# Patient Record
Sex: MALE | Race: WHITE | NOT HISPANIC OR LATINO | Employment: FULL TIME | URBAN - METROPOLITAN AREA
[De-identification: names, ages, dates, MRNs, and addresses within clinical notes are randomized per-mention and may not be internally consistent; named-entity substitution may affect disease eponyms.]

---

## 2018-11-07 ENCOUNTER — HOSPITAL ENCOUNTER (EMERGENCY)
Facility: MEDICAL CENTER | Age: 43
End: 2018-11-07
Attending: EMERGENCY MEDICINE
Payer: COMMERCIAL

## 2018-11-07 VITALS
OXYGEN SATURATION: 97 % | DIASTOLIC BLOOD PRESSURE: 97 MMHG | TEMPERATURE: 98 F | SYSTOLIC BLOOD PRESSURE: 137 MMHG | HEART RATE: 66 BPM | BODY MASS INDEX: 29.17 KG/M2 | RESPIRATION RATE: 18 BRPM | WEIGHT: 227.29 LBS | HEIGHT: 74 IN

## 2018-11-07 DIAGNOSIS — D69.6 THROMBOCYTOPENIA (HCC): ICD-10-CM

## 2018-11-07 DIAGNOSIS — K40.90 RIGHT INGUINAL HERNIA: ICD-10-CM

## 2018-11-07 LAB
ALBUMIN SERPL BCP-MCNC: 4.3 G/DL (ref 3.2–4.9)
ALBUMIN/GLOB SERPL: 1.7 G/DL
ALP SERPL-CCNC: 41 U/L (ref 30–99)
ALT SERPL-CCNC: 40 U/L (ref 2–50)
ANION GAP SERPL CALC-SCNC: 10 MMOL/L (ref 0–11.9)
APPEARANCE UR: CLEAR
AST SERPL-CCNC: 37 U/L (ref 12–45)
BASOPHILS # BLD AUTO: 0.9 % (ref 0–1.8)
BASOPHILS # BLD: 0.07 K/UL (ref 0–0.12)
BILIRUB SERPL-MCNC: 1.7 MG/DL (ref 0.1–1.5)
BILIRUB UR QL STRIP.AUTO: NEGATIVE
BUN SERPL-MCNC: 18 MG/DL (ref 8–22)
CALCIUM SERPL-MCNC: 10.4 MG/DL (ref 8.5–10.5)
CHLORIDE SERPL-SCNC: 104 MMOL/L (ref 96–112)
CO2 SERPL-SCNC: 23 MMOL/L (ref 20–33)
COLOR UR: YELLOW
CREAT SERPL-MCNC: 0.94 MG/DL (ref 0.5–1.4)
EOSINOPHIL # BLD AUTO: 0.17 K/UL (ref 0–0.51)
EOSINOPHIL NFR BLD: 2.3 % (ref 0–6.9)
ERYTHROCYTE [DISTWIDTH] IN BLOOD BY AUTOMATED COUNT: 38.7 FL (ref 35.9–50)
GLOBULIN SER CALC-MCNC: 2.6 G/DL (ref 1.9–3.5)
GLUCOSE SERPL-MCNC: 99 MG/DL (ref 65–99)
GLUCOSE UR STRIP.AUTO-MCNC: NEGATIVE MG/DL
HCT VFR BLD AUTO: 49.8 % (ref 42–52)
HGB BLD-MCNC: 17.6 G/DL (ref 14–18)
IMM GRANULOCYTES # BLD AUTO: 0.04 K/UL (ref 0–0.11)
IMM GRANULOCYTES NFR BLD AUTO: 0.5 % (ref 0–0.9)
KETONES UR STRIP.AUTO-MCNC: NEGATIVE MG/DL
LEUKOCYTE ESTERASE UR QL STRIP.AUTO: NEGATIVE
LIPASE SERPL-CCNC: 57 U/L (ref 11–82)
LYMPHOCYTES # BLD AUTO: 1.75 K/UL (ref 1–4.8)
LYMPHOCYTES NFR BLD: 23.6 % (ref 22–41)
MCH RBC QN AUTO: 32.1 PG (ref 27–33)
MCHC RBC AUTO-ENTMCNC: 35.3 G/DL (ref 33.7–35.3)
MCV RBC AUTO: 90.9 FL (ref 81.4–97.8)
MICRO URNS: NORMAL
MONOCYTES # BLD AUTO: 0.82 K/UL (ref 0–0.85)
MONOCYTES NFR BLD AUTO: 11 % (ref 0–13.4)
NEUTROPHILS # BLD AUTO: 4.58 K/UL (ref 1.82–7.42)
NEUTROPHILS NFR BLD: 61.7 % (ref 44–72)
NITRITE UR QL STRIP.AUTO: NEGATIVE
NRBC # BLD AUTO: 0 K/UL
NRBC BLD-RTO: 0 /100 WBC
PH UR STRIP.AUTO: 6 [PH]
PLATELET # BLD AUTO: 92 K/UL (ref 164–446)
PMV BLD AUTO: 8.9 FL (ref 9–12.9)
POTASSIUM SERPL-SCNC: 4.1 MMOL/L (ref 3.6–5.5)
PROT SERPL-MCNC: 6.9 G/DL (ref 6–8.2)
PROT UR QL STRIP: NEGATIVE MG/DL
RBC # BLD AUTO: 5.48 M/UL (ref 4.7–6.1)
RBC UR QL AUTO: NEGATIVE
SODIUM SERPL-SCNC: 137 MMOL/L (ref 135–145)
SP GR UR STRIP.AUTO: 1.01
UROBILINOGEN UR STRIP.AUTO-MCNC: 1 MG/DL
WBC # BLD AUTO: 7.4 K/UL (ref 4.8–10.8)

## 2018-11-07 PROCEDURE — 99284 EMERGENCY DEPT VISIT MOD MDM: CPT

## 2018-11-07 PROCEDURE — 83690 ASSAY OF LIPASE: CPT

## 2018-11-07 PROCEDURE — 85025 COMPLETE CBC W/AUTO DIFF WBC: CPT

## 2018-11-07 PROCEDURE — 80053 COMPREHEN METABOLIC PANEL: CPT

## 2018-11-07 PROCEDURE — 81003 URINALYSIS AUTO W/O SCOPE: CPT

## 2018-11-07 PROCEDURE — 36415 COLL VENOUS BLD VENIPUNCTURE: CPT

## 2018-11-07 RX ORDER — LACTULOSE 20 G/30ML
30 SOLUTION ORAL 3 TIMES DAILY
Qty: 6 EACH | Refills: 0 | Status: SHIPPED | OUTPATIENT
Start: 2018-11-07 | End: 2018-11-09

## 2018-11-07 ASSESSMENT — ENCOUNTER SYMPTOMS
CONSTIPATION: 1
VOMITING: 0
NAUSEA: 0
ABDOMINAL PAIN: 1
FEVER: 0

## 2018-11-07 NOTE — LETTER
Emergency Services     November 7, 2018    Patient: Charlie Patino II   YOB: 1975   Date of Visit: 11/7/2018       To Whom It May Concern:    Charlie Patino was seen and treated in our emergency department on 11/7/2018.   He may return to work on 11/9/2018.    Sincerely,     ANA FOSTER II, M.D.  [facility], EMERGENCY DEPT  Dept: [phone]

## 2018-11-08 ENCOUNTER — PATIENT OUTREACH (OUTPATIENT)
Dept: HEALTH INFORMATION MANAGEMENT | Facility: OTHER | Age: 43
End: 2018-11-08

## 2018-11-08 ASSESSMENT — ENCOUNTER SYMPTOMS
SHORTNESS OF BREATH: 0
BRUISES/BLEEDS EASILY: 0
COUGH: 0
NECK PAIN: 0
HEADACHES: 0

## 2018-11-08 NOTE — ED NOTES
Pt reports RLQ pain that radiates to R groin x 1 year. Pt states pain has been worse that past few days and is worse upon standing. Pt denies n/v/d or urinary symptoms. Reports constipation x several days, but has not attempted any otc laxatives. Pt in nad.

## 2018-11-08 NOTE — ED NOTES
"Pt ambulates to triage with c/c abd pain - \"I think I have a hernia.\"  Pt reports worsening abd pain today after twisting.  Pt also c/c constipation since Sunday.  A&ox4.  Pt to lobby & advised to inform RN of any changes.   "

## 2018-11-08 NOTE — ED NOTES
D/c instructions and rx reviewed with pt. Pt verbalized understanding. Pt in nad at time of d/c. Pt ambulatory out of department with steady gait.

## 2018-11-08 NOTE — ED NOTES
Pt reassessed. VSS. Apologized to pt for wait. Pt states understanding. Instructed to alert tech or RN of any changes.

## 2018-11-08 NOTE — ED PROVIDER NOTES
"ED Provider Note    Scribed for Damaso ANA Stanford II, M* by Hay Hennessy. 11/7/2018  9:07 PM    Means of Arrival: Walk in  History obtained by: Patient  Limitations: None    CHIEF COMPLAINT  Chief Complaint   Patient presents with   • Abdominal Pain     \"I think I have a hernia\"   • Constipation     since Sunday       HPI  Charlie Patino II is a 43 y.o. male who presents to the Emergency Department for evaluation of for evaluation of intermittent dull lower abdominal pain onset 2 weeks ago. Mr. Patino states that when he stands up, he feels a bulge at his right groin. Also present when bearing down. Bulge reduces spontaneously when laying flat. Pain not present when sitting at rest.  Mr. Patino has not had a bowel movement since 4 days ago which is not his baseline. He has tried drinking energy drinks, water, and fiber bars to have a bowel movement without relief. He has never had any issues with constipation in the past. The patient denies any nausea, emesis, epistaxis, and fevers. No alleviating factors noted.    REVIEW OF SYSTEMS  Review of Systems   Constitutional: Negative for fever.   HENT: Negative for nosebleeds.    Respiratory: Negative for cough and shortness of breath.    Cardiovascular: Negative for chest pain.   Gastrointestinal: Positive for abdominal pain and constipation. Negative for nausea and vomiting.   Genitourinary: Negative for hematuria.   Musculoskeletal: Negative for neck pain.   Neurological: Negative for headaches.   Endo/Heme/Allergies: Does not bruise/bleed easily.   All other systems reviewed and are negative.    See HPI for further details.    PAST MEDICAL HISTORY  None noted.    SOCIAL HISTORY  Social History     Social History Main Topics   • Smoking status: Never Smoker   • Smokeless tobacco: Never Used   • Alcohol use Yes      Comment: 10 beers / day    • Drug use: No       SURGICAL HISTORY  patient denies any surgical history    CURRENT MEDICATIONS  Home Medications     " "Reviewed by Joanna Demarco R.N. (Registered Nurse) on 11/07/18 at 1812  Med List Status: Complete   Medication Last Dose Status        Patient Cooper Taking any Medications                       ALLERGIES  No Known Allergies    PHYSICAL EXAM  VITAL SIGNS: /97   Pulse 66   Temp 36.7 °C (98 °F)   Resp 18   Ht 1.88 m (6' 2\")   Wt 103.1 kg (227 lb 4.7 oz)   SpO2 97%   BMI 29.18 kg/m²     Pulse ox interpretation: I interpret this pulse ox as normal.  Constitutional: Well appearing middle aged man  HENT: No signs of trauma, Bilateral external ears normal, Nose normal.   Lymphatic: No inguinal lymphadenopathy noted.   Cardiovascular: Regular rate and rhythm, no murmurs. Symmetric distal pulses. No cyanosis of extremities. No peripheral edema of extremities.  Thorax & Lungs: Normal breath sounds, No respiratory distress, No wheezing, No chest tenderness.   Abdomen: Bowel sounds normal, Soft, No tenderness, No masses, No pulsatile masses. No peritoneal signs. No obvious hernias  : When standing up, there is palpable hernia defect in the right inguinal canal, no signs of incarcerated hernia. Uncircumcised penis, normal scrotum, non tender testicle, no skin changes  Skin: Warm, Dry, No erythema, No rash. No petechia or purpura  Back: No midline bony tenderness, No CVA tenderness.   Musculoskeletal: Good range of motion in all major joints. No tenderness to palpation or major deformities noted.   Neurologic: Alert , Normal motor function, Normal sensory function, No focal deficits noted.   Psychiatric: Affect normal, Judgment normal, Mood normal.     DIAGNOSTIC STUDIES / PROCEDURES    LABS  Pertinent Labs & Imaging studies reviewed. (See chart for details)    COURSE & MEDICAL DECISION MAKING  Pertinent Labs & Imaging studies reviewed. (See chart for details)    9:07 PM This is a 43 y.o. male who presents with lower abdominal pain and the differential diagnosis includes but is not limited to Inguinal hernia, no " signs of strangulation or incarceration. History consistent with constipation. Ordered for CBC, CMP, lipase, and UA to evaluate. These were completed at triage. Significant finding of thrombocytopenia. I informed the patient that he should to take a stool softener and laxative for treatment of constipatoin. I believe since his vital signs are normal and his abdominal exam is benign, he does not need a CT scan and will be able to be discharged home, but he will need to follow up with a general surgeon for right inguinal hernia. He made need imaging outpatient. We talked about this and I did offer to order imaging here if he had additional concerns or worries. He was satisfied with plan for trial treatment of constipation and surgery follow up.  He does not have a primary care provider at this time.  Because of his low platelet count I have left a message with our schedulers to schedule him a primary care appointment.    The patient will return for worsening symptoms and is stable at the time of discharge. The patient verbalizes understanding and will comply.    The patient is referred to a primary physician for blood pressure management, diabetic screening, and for all other preventative health concerns.    DISPOSITION:  Patient will be discharged home in stable condition.    FOLLOW UP:  Rochelle Nunes M.D.  [address] [facility] [facility] [id number]  [address]  [place] [id number] [phone]    Go to   for follow up appointment and reevaluation    You'll be called by our schedulers within 2 days to set up a primary care appointment for low platelet counts            OUTPATIENT MEDICATIONS:  Discharge Medication List as of 11/7/2018  9:36 PM      START taking these medications    Details   lactulose 20 GM/30ML Solution Take 30 mL by mouth 3 times a day for 2 days., Disp-6 Each, R-0, Normal               FINAL IMPRESSION  1. Right inguinal hernia    2. Thrombocytopenia (HCC)          IHay (Scribe), am scribing for, and in the  presence of, ANGELITA Covington II.    Electronically signed by: Hay Hennessy (Scribe), 11/7/2018    I, ANGELITA Covington II personally performed the services described in this documentation, as scribed by Hay Hennessy in my presence, and it is both accurate and complete. C    The note accurately reflects work and decisions made by me.  Damaso Stanford II  11/8/2018  1:18 AM

## 2018-11-08 NOTE — DISCHARGE INSTRUCTIONS
Procedure Component Value Ref Range Date/Time   URINALYSIS,CULTURE IF INDICATED [[id number]] Collected: 11/07/18 2112   Lab Status: In process Specimen: Urine Updated: 11/07/18 2132   ESTIMATED GFR [[id number]] Collected: 11/07/18 1831   Lab Status: Final result Updated: 11/07/18 1907    GFR If African American >60 >60 mL/min/1.73 m 2     GFR If Non African American >60 >60 mL/min/1.73 m 2    COMP METABOLIC PANEL [[id number]] (Abnormal) Collected: 11/07/18 1831   Lab Status: Final result Specimen: Blood Updated: 11/07/18 1906    Sodium 137 135 - 145 mmol/L     Potassium 4.1 3.6 - 5.5 mmol/L     Chloride 104 96 - 112 mmol/L     Co2 23 20 - 33 mmol/L     Anion Gap 10.0 0.0 - 11.9     Glucose 99 65 - 99 mg/dL     Bun 18 8 - 22 mg/dL     Creatinine 0.94 0.50 - 1.40 mg/dL     Calcium 10.4 8.5 - 10.5 mg/dL     AST(SGOT) 37 12 - 45 U/L     ALT(SGPT) 40 2 - 50 U/L     Alkaline Phosphatase 41 30 - 99 U/L     Total Bilirubin 1.7 (H) 0.1 - 1.5 mg/dL     Albumin 4.3 3.2 - 4.9 g/dL     Total Protein 6.9 6.0 - 8.2 g/dL     Globulin 2.6 1.9 - 3.5 g/dL     A-G Ratio 1.7 g/dL    LIPASE [[id number]] Collected: 11/07/18 1831   Lab Status: Final result Specimen: Blood Updated: 11/07/18 1906    Lipase 57 11 - 82 U/L    CBC WITH DIFFERENTIAL [[id number]] (Abnormal) Collected: 11/07/18 1831   Lab Status: Final result Specimen: Blood Updated: 11/07/18 1845    WBC 7.4 4.8 - 10.8 K/uL     RBC 5.48 4.70 - 6.10 M/uL     Hemoglobin 17.6 14.0 - 18.0 g/dL     Hematocrit 49.8 42.0 - 52.0 %     MCV 90.9 81.4 - 97.8 fL     MCH 32.1 27.0 - 33.0 pg     MCHC 35.3 33.7 - 35.3 g/dL     RDW 38.7 35.9 - 50.0 fL     Platelet Count 92 (L) 164 - 446 K/uL     MPV 8.9 (L) 9.0 - 12.9 fL     Neutrophils-Polys 61.70 44.00 - 72.00 %     Lymphocytes 23.60 22.00 - 41.00 %     Monocytes 11.00 0.00 - 13.40 %     Eosinophils 2.30 0.00 - 6.90 %     Basophils 0.90 0.00 - 1.80 %     Immature Granulocytes 0.50 0.00 - 0.90 %     Nucleated RBC 0.00 /100 WBC     Neutrophils  (Absolute) 4.58 1.82 - 7.42 K/uL     Comment: Includes immature neutrophils, if present.       Lymphs (Absolute) 1.75 1.00 - 4.80 K/uL     Monos (Absolute) 0.82 0.00 - 0.85 K/uL     Eos (Absolute) 0.17 0.00 - 0.51 K/uL     Baso (Absolute) 0.07 0.00 - 0.12 K/uL     Immature Granulocytes (abs) 0.04 0.00 - 0.11 K/uL     NRBC (Absolute) 0.00 K/uL

## 2018-11-29 DIAGNOSIS — Z01.812 PRE-OPERATIVE LABORATORY EXAMINATION: ICD-10-CM

## 2018-11-29 LAB
ALBUMIN SERPL BCP-MCNC: 4.1 G/DL (ref 3.2–4.9)
ALBUMIN/GLOB SERPL: 1.5 G/DL
ALP SERPL-CCNC: 38 U/L (ref 30–99)
ALT SERPL-CCNC: 39 U/L (ref 2–50)
ANION GAP SERPL CALC-SCNC: 8 MMOL/L (ref 0–11.9)
AST SERPL-CCNC: 30 U/L (ref 12–45)
BASOPHILS # BLD AUTO: 0.9 % (ref 0–1.8)
BASOPHILS # BLD: 0.06 K/UL (ref 0–0.12)
BILIRUB SERPL-MCNC: 1.3 MG/DL (ref 0.1–1.5)
BUN SERPL-MCNC: 12 MG/DL (ref 8–22)
CALCIUM SERPL-MCNC: 9.8 MG/DL (ref 8.5–10.5)
CHLORIDE SERPL-SCNC: 101 MMOL/L (ref 96–112)
CO2 SERPL-SCNC: 29 MMOL/L (ref 20–33)
CREAT SERPL-MCNC: 0.78 MG/DL (ref 0.5–1.4)
EOSINOPHIL # BLD AUTO: 0.09 K/UL (ref 0–0.51)
EOSINOPHIL NFR BLD: 1.4 % (ref 0–6.9)
ERYTHROCYTE [DISTWIDTH] IN BLOOD BY AUTOMATED COUNT: 37.3 FL (ref 35.9–50)
GLOBULIN SER CALC-MCNC: 2.8 G/DL (ref 1.9–3.5)
GLUCOSE SERPL-MCNC: 116 MG/DL (ref 65–99)
HCT VFR BLD AUTO: 49.5 % (ref 42–52)
HGB BLD-MCNC: 18.1 G/DL (ref 14–18)
IMM GRANULOCYTES # BLD AUTO: 0.06 K/UL (ref 0–0.11)
IMM GRANULOCYTES NFR BLD AUTO: 0.9 % (ref 0–0.9)
INR PPP: 1.06 (ref 0.87–1.13)
LYMPHOCYTES # BLD AUTO: 1.38 K/UL (ref 1–4.8)
LYMPHOCYTES NFR BLD: 21.3 % (ref 22–41)
MCH RBC QN AUTO: 33 PG (ref 27–33)
MCHC RBC AUTO-ENTMCNC: 36.6 G/DL (ref 33.7–35.3)
MCV RBC AUTO: 90.2 FL (ref 81.4–97.8)
MONOCYTES # BLD AUTO: 0.71 K/UL (ref 0–0.85)
MONOCYTES NFR BLD AUTO: 11 % (ref 0–13.4)
NEUTROPHILS # BLD AUTO: 4.18 K/UL (ref 1.82–7.42)
NEUTROPHILS NFR BLD: 64.5 % (ref 44–72)
NRBC # BLD AUTO: 0 K/UL
NRBC BLD-RTO: 0 /100 WBC
PLATELET # BLD AUTO: 99 K/UL (ref 164–446)
PMV BLD AUTO: 9.3 FL (ref 9–12.9)
POTASSIUM SERPL-SCNC: 4.1 MMOL/L (ref 3.6–5.5)
PROT SERPL-MCNC: 6.9 G/DL (ref 6–8.2)
PROTHROMBIN TIME: 13.9 SEC (ref 12–14.6)
RBC # BLD AUTO: 5.49 M/UL (ref 4.7–6.1)
SODIUM SERPL-SCNC: 138 MMOL/L (ref 135–145)
WBC # BLD AUTO: 6.5 K/UL (ref 4.8–10.8)

## 2018-11-29 PROCEDURE — 85610 PROTHROMBIN TIME: CPT

## 2018-11-29 PROCEDURE — 80053 COMPREHEN METABOLIC PANEL: CPT

## 2018-11-29 PROCEDURE — 36415 COLL VENOUS BLD VENIPUNCTURE: CPT

## 2018-11-29 PROCEDURE — 85025 COMPLETE CBC W/AUTO DIFF WBC: CPT

## 2018-11-29 RX ORDER — OMEGA-3 FATTY ACIDS/FISH OIL 300-1000MG
600 CAPSULE ORAL PRN
COMMUNITY

## 2018-12-04 ENCOUNTER — HOSPITAL ENCOUNTER (OUTPATIENT)
Facility: MEDICAL CENTER | Age: 43
End: 2018-12-04
Attending: SURGERY | Admitting: SURGERY
Payer: COMMERCIAL

## 2018-12-04 VITALS
OXYGEN SATURATION: 96 % | RESPIRATION RATE: 16 BRPM | WEIGHT: 229.28 LBS | HEART RATE: 69 BPM | BODY MASS INDEX: 29.43 KG/M2 | DIASTOLIC BLOOD PRESSURE: 106 MMHG | HEIGHT: 74 IN | TEMPERATURE: 98.5 F | SYSTOLIC BLOOD PRESSURE: 149 MMHG

## 2018-12-04 DIAGNOSIS — K40.90 RIGHT INGUINAL HERNIA: ICD-10-CM

## 2018-12-04 PROCEDURE — 501838 HCHG SUTURE GENERAL: Performed by: SURGERY

## 2018-12-04 PROCEDURE — 160036 HCHG PACU - EA ADDL 30 MINS PHASE I: Performed by: SURGERY

## 2018-12-04 PROCEDURE — 160035 HCHG PACU - 1ST 60 MINS PHASE I: Performed by: SURGERY

## 2018-12-04 PROCEDURE — 160048 HCHG OR STATISTICAL LEVEL 1-5: Performed by: SURGERY

## 2018-12-04 PROCEDURE — C1781 MESH (IMPLANTABLE): HCPCS | Performed by: SURGERY

## 2018-12-04 PROCEDURE — 500380 HCHG DRAIN, PENROSE 1/4X12: Performed by: SURGERY

## 2018-12-04 PROCEDURE — 160028 HCHG SURGERY MINUTES - 1ST 30 MINS LEVEL 3: Performed by: SURGERY

## 2018-12-04 PROCEDURE — 160009 HCHG ANES TIME/MIN: Performed by: SURGERY

## 2018-12-04 PROCEDURE — A6402 STERILE GAUZE <= 16 SQ IN: HCPCS | Performed by: SURGERY

## 2018-12-04 PROCEDURE — 700101 HCHG RX REV CODE 250

## 2018-12-04 PROCEDURE — 160046 HCHG PACU - 1ST 60 MINS PHASE II: Performed by: SURGERY

## 2018-12-04 PROCEDURE — 160039 HCHG SURGERY MINUTES - EA ADDL 1 MIN LEVEL 3: Performed by: SURGERY

## 2018-12-04 PROCEDURE — 160025 RECOVERY II MINUTES (STATS): Performed by: SURGERY

## 2018-12-04 PROCEDURE — 700111 HCHG RX REV CODE 636 W/ 250 OVERRIDE (IP)

## 2018-12-04 PROCEDURE — 160002 HCHG RECOVERY MINUTES (STAT): Performed by: SURGERY

## 2018-12-04 DEVICE — MESH FLAT SHEET 3 X 6 - (3EA/CA): Type: IMPLANTABLE DEVICE | Site: GROIN | Status: FUNCTIONAL

## 2018-12-04 RX ORDER — SODIUM CHLORIDE, SODIUM LACTATE, POTASSIUM CHLORIDE, CALCIUM CHLORIDE 600; 310; 30; 20 MG/100ML; MG/100ML; MG/100ML; MG/100ML
INJECTION, SOLUTION INTRAVENOUS CONTINUOUS
Status: DISCONTINUED | OUTPATIENT
Start: 2018-12-04 | End: 2018-12-04 | Stop reason: HOSPADM

## 2018-12-04 RX ORDER — OXYCODONE HYDROCHLORIDE 5 MG/1
10 TABLET ORAL
Status: DISCONTINUED | OUTPATIENT
Start: 2018-12-04 | End: 2018-12-04 | Stop reason: HOSPADM

## 2018-12-04 RX ORDER — HYDROMORPHONE HYDROCHLORIDE 1 MG/ML
0.4 INJECTION, SOLUTION INTRAMUSCULAR; INTRAVENOUS; SUBCUTANEOUS
Status: DISCONTINUED | OUTPATIENT
Start: 2018-12-04 | End: 2018-12-04 | Stop reason: HOSPADM

## 2018-12-04 RX ORDER — MEPERIDINE HYDROCHLORIDE 25 MG/ML
12.5 INJECTION INTRAMUSCULAR; INTRAVENOUS; SUBCUTANEOUS
Status: DISCONTINUED | OUTPATIENT
Start: 2018-12-04 | End: 2018-12-04 | Stop reason: HOSPADM

## 2018-12-04 RX ORDER — HYDROMORPHONE HYDROCHLORIDE 1 MG/ML
0.2 INJECTION, SOLUTION INTRAMUSCULAR; INTRAVENOUS; SUBCUTANEOUS
Status: DISCONTINUED | OUTPATIENT
Start: 2018-12-04 | End: 2018-12-04 | Stop reason: HOSPADM

## 2018-12-04 RX ORDER — HYDROCODONE BITARTRATE AND ACETAMINOPHEN 5; 325 MG/1; MG/1
1-2 TABLET ORAL EVERY 4 HOURS PRN
Qty: 30 TAB | Refills: 0 | Status: SHIPPED | OUTPATIENT
Start: 2018-12-04 | End: 2018-12-08

## 2018-12-04 RX ORDER — OXYCODONE HYDROCHLORIDE 5 MG/1
5 TABLET ORAL
Status: DISCONTINUED | OUTPATIENT
Start: 2018-12-04 | End: 2018-12-04 | Stop reason: HOSPADM

## 2018-12-04 RX ORDER — ONDANSETRON 2 MG/ML
4 INJECTION INTRAMUSCULAR; INTRAVENOUS
Status: DISCONTINUED | OUTPATIENT
Start: 2018-12-04 | End: 2018-12-04 | Stop reason: HOSPADM

## 2018-12-04 RX ORDER — SODIUM CHLORIDE, SODIUM LACTATE, POTASSIUM CHLORIDE, CALCIUM CHLORIDE 600; 310; 30; 20 MG/100ML; MG/100ML; MG/100ML; MG/100ML
INJECTION, SOLUTION INTRAVENOUS ONCE
Status: COMPLETED | OUTPATIENT
Start: 2018-12-04 | End: 2018-12-04

## 2018-12-04 RX ORDER — OXYCODONE HCL 5 MG/5 ML
10 SOLUTION, ORAL ORAL
Status: DISCONTINUED | OUTPATIENT
Start: 2018-12-04 | End: 2018-12-04 | Stop reason: HOSPADM

## 2018-12-04 RX ORDER — HYDROMORPHONE HYDROCHLORIDE 1 MG/ML
0.1 INJECTION, SOLUTION INTRAMUSCULAR; INTRAVENOUS; SUBCUTANEOUS
Status: DISCONTINUED | OUTPATIENT
Start: 2018-12-04 | End: 2018-12-04 | Stop reason: HOSPADM

## 2018-12-04 RX ORDER — BUPIVACAINE HYDROCHLORIDE AND EPINEPHRINE 5; 5 MG/ML; UG/ML
INJECTION, SOLUTION EPIDURAL; INTRACAUDAL; PERINEURAL
Status: DISCONTINUED | OUTPATIENT
Start: 2018-12-04 | End: 2018-12-04 | Stop reason: HOSPADM

## 2018-12-04 RX ORDER — HALOPERIDOL 5 MG/ML
1 INJECTION INTRAMUSCULAR
Status: DISCONTINUED | OUTPATIENT
Start: 2018-12-04 | End: 2018-12-04 | Stop reason: HOSPADM

## 2018-12-04 RX ORDER — OXYCODONE HCL 5 MG/5 ML
5 SOLUTION, ORAL ORAL
Status: DISCONTINUED | OUTPATIENT
Start: 2018-12-04 | End: 2018-12-04 | Stop reason: HOSPADM

## 2018-12-04 RX ADMIN — SODIUM CHLORIDE, SODIUM LACTATE, POTASSIUM CHLORIDE, CALCIUM CHLORIDE: 600; 310; 30; 20 INJECTION, SOLUTION INTRAVENOUS at 12:00

## 2018-12-04 ASSESSMENT — PAIN SCALES - GENERAL
PAINLEVEL_OUTOF10: 0
PAINLEVEL_OUTOF10: 2
PAINLEVEL_OUTOF10: 0

## 2018-12-04 NOTE — OR NURSING
1525 REPORT FROM ALLIE FORD. PATIENT RESTING QUIETLY.  DRESSING DRY AND IN TACT.  ICE PACK IN PLACE.      1555 MORE AWAKE.  GIVEN SPRITE.  WIFE BROUGHT TO BEDSIDE.  1615 WIFE TO PHARMACY.    1628  NO CHANGE IN DRESSING.  PATIENT UP AND DRESSED.  AMBULATES TO BATHROOM.  VOID WITHOUT PROBLEM.  IN RECLINER WITHOUT COMPLAINT.  1645  DISCHARGE INSTRUCTIONS TO PATIENT AND WIFE.  WAITING FOR RX.  1715 WIFE BACK FROM PHARMACY.  PATIENT FEELS READY FOR DISCHARGE.  ALL QUESTIONS ANSWERED.

## 2018-12-04 NOTE — OR NURSING
1514 Pt received to PACU with report from .  Respirations even and unlabored, LMA in place.LMA removed 1524 Report given to LOGAN Kim and care transferred.

## 2018-12-05 NOTE — DISCHARGE INSTRUCTIONS
ACTIVITY: Rest and take it easy for the first 24 hours.  A responsible adult is recommended to remain with you during that time.  It is normal to feel sleepy.  We encourage you to not do anything that requires balance, judgment or coordination.    MILD FLU-LIKE SYMPTOMS ARE NORMAL. YOU MAY EXPERIENCE GENERALIZED MUSCLE ACHES, THROAT IRRITATION, HEADACHE AND/OR SOME NAUSEA.    FOR 24 HOURS DO NOT:  Drive, operate machinery or run household appliances.  Drink beer or alcoholic beverages.   Make important decisions or sign legal documents.    SPECIAL INSTRUCTIONS: **AVOID ANY HEAVY LIFTING GREATER THAN 15 POUNDS FOR FOUR WEEKS*    DIET: To avoid nausea, slowly advance diet as tolerated, avoiding spicy or greasy foods for the first day.  Add more substantial food to your diet according to your physician's instructions.  Babies can be fed formula or breast milk as soon as they are hungry.  INCREASE FLUIDS AND FIBER TO AVOID CONSTIPATION.    SURGICAL DRESSING/BATHING: *OK TO SHOWER ON THURSDAY.  REMOVE PLASTIC AND GAUZE IN THREE DAYS.   LEAVE UNDERLYING STERISTRIPS ON UNTIL THEY FALL OFF.**    FOLLOW-UP APPOINTMENT:  A follow-up appointment should be arranged with your doctor; call to schedule.    You should CALL YOUR PHYSICIAN if you develop:  Fever greater than 101 degrees F.  Pain not relieved by medication, or persistent nausea or vomiting.  Excessive bleeding (blood soaking through dressing) or unexpected drainage from the wound.  Extreme redness or swelling around the incision site, drainage of pus or foul smelling drainage.  Inability to urinate or empty your bladder within 8 hours.  Problems with breathing or chest pain.    You should call 911 if you develop problems with breathing or chest pain.  If you are unable to contact your doctor or surgical center, you should go to the nearest emergency room or urgent care center.    Physician's telephone #: **[phone]*    If any questions arise, call your doctor.  If  your doctor is not available, please feel free to call the Surgical Center at [phone].  The Center is open Monday through Friday from 7AM to 7PM.  You can also call the HEALTH HOTLINE open 24 hours/day, 7 days/week and speak to a nurse at [phone], or toll free at [phone].    A registered nurse may call you a few days after your surgery to see how you are doing after your procedure.    MEDICATIONS: Resume taking daily medication.  Take prescribed pain medication with food.  If no medication is prescribed, you may take non-aspirin pain medication if needed.  PAIN MEDICATION CAN BE VERY CONSTIPATING.  Take a stool softener or laxative such as senokot, pericolace, or milk of magnesia if needed.    Prescription given for *NORCO**.  Last pain medication given at *____________**.    If your physician has prescribed pain medication that includes Acetaminophen (Tylenol), do not take additional Acetaminophen (Tylenol) while taking the prescribed medication.    Depression / Suicide Risk    As you are discharged from this [facility] facility, it is important to learn how to keep safe from harming yourself.    Recognize the warning signs:  · Abrupt changes in personality, positive or negative- including increase in energy   · Giving away possessions  · Change in eating patterns- significant weight changes-  positive or negative  · Change in sleeping patterns- unable to sleep or sleeping all the time   · Unwillingness or inability to communicate  · Depression  · Unusual sadness, discouragement and loneliness  · Talk of wanting to die  · Neglect of personal appearance   · Rebelliousness- reckless behavior  · Withdrawal from people/activities they love  · Confusion- inability to concentrate     If you or a loved one observes any of these behaviors or has concerns about self-harm, here's what you can do:  · Talk about it- your feelings and reasons for harming yourself  · Remove any means that you might use to hurt  yourself (examples: pills, rope, extension cords, firearm)  · Get professional help from the community (Mental Health, Substance Abuse, psychological counseling)  · Do not be alone:Call your Safe Contact- someone whom you trust who will be there for you.  · Call your local CRISIS HOTLINE 094-2136 or 430-557-9917  · Call your local Children's Mobile Crisis Response Team [place] (632) 311-6028 or www.Cartavi  · Call the toll free National Suicide Prevention Hotlines   · National Suicide Prevention Lifeline 811-421-IVES (5279)  · National Hope Line Network 800-SUICIDE (092-1420)

## 2018-12-05 NOTE — OP REPORT
DATE OF SERVICE:  12/04/2018    SURGEON:  Epi Mi MD    ASSISTANT:  YOSVANY Reddy    TYPE OF ANESTHETIC:  General anesthesia using a laryngeal mask airway plus   local.    ANESTHESIOLOGIST:  Edenilson Agee MD    PREOPERATIVE DIAGNOSIS:  Large right inguinal hernia.    POSTOPERATIVE DIAGNOSIS:  Large right inguinal hernia.    PROCEDURE:  Repair of right inguinal hernia using polypropylene mesh.    FINDINGS:  The patient is a 43-year-old male who presents with a symptomatic   right groin protrusion, this has been present for at least the last month.  He   has had pain in this area for over 2 years.  After evaluation, he elected to   proceed with repair of this hernia.  At the time of surgery, he was found to   have a large indirect inguinal hernia with some chronicity noted to the sac as   well as a moderate size direct inguinal hernia.  Repair was performed using   polypropylene mesh.  There were no apparent complications.    FINDINGS AND PROCEDURE:  The patient was brought to the operating room and   placed on table in supine position.  General anesthesia was provided by the   anesthesiologist, Dr. Agee.  The right groin area was then shaved, prepped   and draped in sterile fashion.  A 0.5% Marcaine with epinephrine was used for   local anesthesia and a total of 30 mL was utilized.  A time-out was called.    The correct patient, correct diagnosis, correct surgery, and correct location   and surgery were discussed and agreed upon, he did receive preoperative IV   antibiotics.  An incision was made directly over the right inguinal canal with   #15 blade through the skin and subcutaneous tissue.  All bleeding was   controlled with electrocautery.  Patient had rather generous subcutaneous   tissue and this was dissected down through Jamal's fascia until the external   oblique aponeurosis identified.  This structure was then opened along the   direction of fibers through the external ring.   Patient was noted to have a   somewhat small ilioinguinal nerve, which was ligated as high as possible, as   low as possible in the operative field in order to prevent chronic pain.  This   had been densely adherent to what appears to be an indirect inguinal hernia   sac.  The hernia sac as well as the cord structures were elevated off the   floor of the inguinal canal with a Penrose drain.  Patient had a lot of   scarring and dense adherent tissue in the direct space all the way down to the   symphysis pubis.  Careful dissection was made in order to elevate the cord   structures off of the floor.  Patient was noted to have a broad direct   inguinal hernia, but the main hernia was an indirect coming through the   internal ring.  This was a chronically scarred, thick sac extending almost the   entire length of the inguinal canal.  This was carefully dissected away from   the vas deferens and testicular vessels and traced back to its origin of the   internal ring.  It was opened and found to contain no bowel contents.  It was   twisted and underwent high ligation with a 3-0 Vicryl suture.  The distal   aspect of the sac was transected and discarded.  The proximal aspect retracted   back into the peritoneal space.  The patient was noted to have a large   internal ring where the hernia had occurred.  A 3x5 inch piece of   polypropylene mesh was then cut to fit the confines of the inguinal space.    The lateral edge of the mesh was then sutured to the shelving portion of the   inguinal ligament with a running 0 Prolene suture beginning at the pubic   tubercle ending about 1 cm above the internal ring.  Likewise, the medial edge   of the mesh was sutured to the internal oblique fascia with a running 0   Prolene suture beginning at the pubic tubercle ending about 1 cm above the   internal ring.  A slit was then made in the upper portion of the mesh which   created 2 tails in the mesh.  The medial tail was then crossed  behind the   spermatic cord, sutured to the lateral tail and to the shelving portion of the   inguinal ligament with 2-0 Prolene sutures.  This made a new internal ring.    The excess portions of the mesh were trimmed and discarded and the edges were   tucked underneath the external oblique aponeurosis.  The Penrose drain was   removed.  Additional 0.5% Marcaine was used for local anesthesia.  The   external oblique was then closed using running 3-0 Vicryl suture loosely   reapproximating the external ring below Jamal's fascia and the dermis was   closed using running 3-0 Vicryl suture.  The skin was closed using running 4-0   Monocryl suture in subcuticular fashion.  Steri-Strips and sterile dressing   were applied.  The patient did tolerate procedure well without complications.    Final sponge and needle count were correct.       ____________________________________     MD CURTIS JAMISON / OMER    DD:  12/04/2018 17:09:52  DT:  12/04/2018 20:11:57    D#:  [id number]  Job#:  [id number]

## 2020-08-02 ENCOUNTER — OFFICE VISIT (OUTPATIENT)
Dept: URGENT CARE | Facility: PHYSICIAN GROUP | Age: 45
End: 2020-08-02
Payer: COMMERCIAL

## 2020-08-02 ENCOUNTER — HOSPITAL ENCOUNTER (OUTPATIENT)
Dept: RADIOLOGY | Facility: MEDICAL CENTER | Age: 45
End: 2020-08-02
Attending: PHYSICIAN ASSISTANT
Payer: COMMERCIAL

## 2020-08-02 VITALS
OXYGEN SATURATION: 95 % | HEART RATE: 72 BPM | WEIGHT: 244 LBS | RESPIRATION RATE: 16 BRPM | DIASTOLIC BLOOD PRESSURE: 92 MMHG | SYSTOLIC BLOOD PRESSURE: 152 MMHG | HEIGHT: 74 IN | BODY MASS INDEX: 31.32 KG/M2 | TEMPERATURE: 98.4 F

## 2020-08-02 DIAGNOSIS — S80.01XA CONTUSION OF RIGHT KNEE AND LOWER LEG, INITIAL ENCOUNTER: ICD-10-CM

## 2020-08-02 DIAGNOSIS — S86.912A KNEE STRAIN, LEFT, INITIAL ENCOUNTER: ICD-10-CM

## 2020-08-02 DIAGNOSIS — M79.671 RIGHT FOOT PAIN: ICD-10-CM

## 2020-08-02 DIAGNOSIS — S80.11XA CONTUSION OF RIGHT KNEE AND LOWER LEG, INITIAL ENCOUNTER: ICD-10-CM

## 2020-08-02 PROCEDURE — 73590 X-RAY EXAM OF LOWER LEG: CPT | Mod: RT

## 2020-08-02 PROCEDURE — 99203 OFFICE O/P NEW LOW 30 MIN: CPT | Performed by: PHYSICIAN ASSISTANT

## 2020-08-02 PROCEDURE — 73630 X-RAY EXAM OF FOOT: CPT | Mod: RT

## 2020-08-02 ASSESSMENT — FIBROSIS 4 INDEX: FIB4 SCORE: 2.09

## 2020-08-02 ASSESSMENT — ENCOUNTER SYMPTOMS
NUMBNESS: 0
BOWEL INCONTINENCE: 0
TINGLING: 0
HEADACHES: 0
ABDOMINAL PAIN: 0
LOSS OF CONSCIOUSNESS: 0

## 2020-08-02 NOTE — PROGRESS NOTES
Subjective:   Charlie Patino II is a 45 y.o. male who presents today with   Chief Complaint   Patient presents with   • Fall     R leg injury/ L knee [id number]        Fall   The accident occurred 5 to 7 days ago. The fall occurred while walking. He fell from a height of 6 to 10 ft. There was no blood loss. The point of impact was the left knee and right knee. The pain is present in the left knee and right lower leg. The pain is mild. The symptoms are aggravated by ambulation and standing. Pertinent negatives include no abdominal pain, bowel incontinence, headaches, hematuria, loss of consciousness, numbness or tingling. He has tried nothing for the symptoms. The treatment provided no relief.     Patient states he was hiking last week and slid down approximately 8 to 10 feet on a vic.  He denies any head injury or any loss of consciousness.  He states that his left knee was significantly affected and now when he bears weight on it he notices mild deformity to the lateral portion.  He states he has had ACL and meniscus repair to the area in 2001.  Patient also notes significant pain and swelling in his right lower leg and right foot. Patient asking for MRI of left knee given history.   PMH:  has a past medical history of Pain (11/29/2018), Pneumonia (2003), and Snoring.  MEDS:   Current Outpatient Medications:   •  Ibuprofen (ADVIL) 200 MG Cap, Take 600 mg by mouth as needed., Disp: , Rfl:   ALLERGIES: No Known Allergies  SURGHX:   Past Surgical History:   Procedure Laterality Date   • INGUINAL HERNIA REPAIR Right 12/4/2018    Procedure: INGUINAL HERNIA REPAIR;  Surgeon: Epi Mi M.D.;  Location: SURGERY SAME DAY [facility];  Service: General   • MENISCUS REPAIR Left 2001    and ACL, tendon repair     SOCHX:  reports that he has never smoked. He has never used smokeless tobacco. He reports current alcohol use. He reports that he does not use drugs.  FH: Reviewed with patient, not pertinent to this  "visit.       Review of Systems   Gastrointestinal: Negative for abdominal pain and bowel incontinence.   Genitourinary: Negative for hematuria.   Musculoskeletal:        Right foot/leg pain, Left knee pain   Neurological: Negative for tingling, loss of consciousness, numbness and headaches.   All other systems reviewed and are negative.       Objective:   /92   Pulse 72   Temp 36.9 °C (98.4 °F) (Temporal)   Resp 16   Ht 1.88 m (6' 2\")   Wt 110.7 kg (244 lb)   SpO2 95%   BMI 31.33 kg/m²   Physical Exam  Vitals signs and nursing note reviewed.   Constitutional:       General: He is not in acute distress.     Appearance: Normal appearance. He is well-developed. He is not ill-appearing or toxic-appearing.   HENT:      Head: Normocephalic and atraumatic.      Right Ear: Hearing normal.      Left Ear: Hearing normal.   Eyes:      Pupils: Pupils are equal, round, and reactive to light.   Cardiovascular:      Rate and Rhythm: Normal rate and regular rhythm.      Heart sounds: Normal heart sounds.   Pulmonary:      Effort: Pulmonary effort is normal.   Musculoskeletal:        Legs:       Comments: Negative María's sign to right leg. No TTP or swelling, erythema to calf. TTP and ecchymosis to the anterior portion of right lower leg and into the right ankle especially the medial malleolus aspect and over the fourth and fifth metatarsal. NVI distally. Left knee shows small deformity with weightbearing to the lateral portion. No gross laxity with valgus/varus, negative anterior drawer test.  No tenderness to palpation and full range of motion with flexion and extension of the right knee.  Slightly limited range of motion with the left knee secondary to swelling.  Small bruise to the left lower back but no tenderness along the spinous process midline or along any of the ribs.   Skin:     General: Skin is warm and dry.             Comments: Abrasions to anterior right lower leg.   Neurological:      Mental Status: He " is alert.      Coordination: Coordination normal.   Psychiatric:         Mood and Affect: Mood normal.       DX FOOT  FINDINGS:  There is no evidence of fracture or dislocation. Alignment is maintained. Accessory ossicles are seen adjacent to the cuboid.        IMPRESSION:     No evidence of acute fracture or dislocation.    DX LOWER LEG  FINDINGS:  There is no evidence of fracture or dislocation. Alignment is maintained. There is soft tissue swelling in the lower leg.        IMPRESSION:     No evidence of acute fracture or dislocation.     Soft tissue swelling in the lower leg.    Assessment/Plan:   Assessment    1. Contusion of right knee and lower leg, initial encounter  - DX-TIBIA AND FIBULA RIGHT; Future  - REFERRAL TO SPORTS MEDICINE    2. Right foot pain  - DX-FOOT-COMPLETE 3+ RIGHT; Future  - REFERRAL TO SPORTS MEDICINE    3. Knee strain, left, initial encounter  - REFERRAL TO SPORTS MEDICINE  - MR-KNEE-W/O LEFT; Future  MRI of the left knee will be ordered given history of repair to that knee.  RICE TREATMENT FOR EXTREMITY INJURIES:  R-rest the extremity as much as possible while pain and swelling persist  I-ice the extremity 15 minutes every 2 hours for the first 24 hours, then 4-5 times daily   C-compress the extremity either with splint or ace wrap as directed  E-elevate the extremity to help with swelling  Follow up with Sports Medicine.   Encouraged close monitoring for any changes in swelling or pain to follow up sooner. No suspicion of DVT today but did discuss potential signs to watch for.   Differential diagnosis, natural history, supportive care, and indications for immediate follow-up discussed.   Patient given instructions and understanding of medications and treatment.    If not improving in 3-5 days, F/U with PCP or return to  if symptoms worsen.    Patient agreeable to plan.      Please note that this dictation was created using voice recognition software. I have made every reasonable  attempt to correct obvious errors, but I expect that there are errors of grammar and possibly content that I did not discover before finalizing the note.    Lefty Chowdary PA-C

## 2020-08-03 ENCOUNTER — TELEPHONE (OUTPATIENT)
Dept: URGENT CARE | Facility: PHYSICIAN GROUP | Age: 45
End: 2020-08-03

## 2020-08-03 ENCOUNTER — TELEPHONE (OUTPATIENT)
Dept: URGENT CARE | Facility: CLINIC | Age: 45
End: 2020-08-03

## 2020-08-03 ENCOUNTER — HOSPITAL ENCOUNTER (OUTPATIENT)
Dept: RADIOLOGY | Facility: MEDICAL CENTER | Age: 45
End: 2020-08-03
Attending: PHYSICIAN ASSISTANT
Payer: COMMERCIAL

## 2020-08-03 DIAGNOSIS — S86.912A KNEE STRAIN, LEFT, INITIAL ENCOUNTER: ICD-10-CM

## 2020-08-03 DIAGNOSIS — S83.242A TEAR OF MEDIAL MENISCUS OF LEFT KNEE, CURRENT, UNSPECIFIED TEAR TYPE, INITIAL ENCOUNTER: ICD-10-CM

## 2020-08-03 PROCEDURE — 73721 MRI JNT OF LWR EXTRE W/O DYE: CPT | Mod: LT

## 2020-08-03 NOTE — TELEPHONE ENCOUNTER
Attempted to call. Left VM. Placed ortho surg today. Encouraged patient to call with any further questions.

## (undated) DEVICE — SUTURE 3-0 VICRYL PLUS SH - 8X 18 INCH (12/BX)

## (undated) DEVICE — CATHETER IV 20 GA X 1-1/4 ---SURG.& SDS ONLY--- (50EA/BX)

## (undated) DEVICE — ELECTRODE 850 FOAM ADHESIVE - HYDROGEL RADIOTRNSPRNT (50/PK)

## (undated) DEVICE — TUBE CONNECTING SUCTION - CLEAR PLASTIC STERILE 72 IN (50EA/CA)

## (undated) DEVICE — GLOVE BIOGEL INDICATOR SZ 7.5 SURGICAL PF LTX - (50PR/BX 4BX/CA)

## (undated) DEVICE — CANISTER SUCTION 3000ML MECHANICAL FILTER AUTO SHUTOFF MEDI-VAC NONSTERILE LF DISP  (40EA/CA)

## (undated) DEVICE — ELECTRODE DUAL RETURN W/ CORD - (50/PK)

## (undated) DEVICE — BLADE SURGICAL #15 - (50/BX 3BX/CA)

## (undated) DEVICE — SUTURE 3-0 VICRYL PLUS SH - 27 INCH (36/BX)

## (undated) DEVICE — DRAPE IOBAN II INCISE 23X17 - (10EA/BX 4BX/CA)

## (undated) DEVICE — DRAPE LAPAROTOMY T SHEET - (12EA/CA)

## (undated) DEVICE — SUCTION INSTRUMENT YANKAUER BULBOUS TIP W/O VENT (50EA/CA)

## (undated) DEVICE — GOWN SURGICAL XX-LARGE - (28EA/CA) SIRUS NON REINFORCED

## (undated) DEVICE — DRESSING TRANSPARENT FILM TEGADERM 4 X 4.75" (50EA/BX)"

## (undated) DEVICE — LACTATED RINGERS INJ 1000 ML - (14EA/CA 60CA/PF)

## (undated) DEVICE — SUTURE 3-0 VICRYL PLUS - 12 X 18 INCH (12/BX)

## (undated) DEVICE — CHLORAPREP 26 ML APPLICATOR - ORANGE TINT(25/CA)

## (undated) DEVICE — DRAIN PENROSE STERILE 1/4 X - 18 IN  (25EA/BX)

## (undated) DEVICE — MASK, LARYNGEAL AIRWAY #4

## (undated) DEVICE — MASK ANESTHESIA ADULT  - (100/CA)

## (undated) DEVICE — SUTURE 4-0 MONOCRYL PLUS PS-2 - 27 INCH (36/BX)

## (undated) DEVICE — GLOVE SZ 6.5 BIOGEL PI MICRO - PF LF (50PR/BX)

## (undated) DEVICE — HEAD HOLDER JUNIOR/ADULT

## (undated) DEVICE — SODIUM CHL IRRIGATION 0.9% 1000ML (12EA/CA)

## (undated) DEVICE — GLOVE BIOGEL PI INDICATOR SZ 7.0 SURGICAL PF LF - (50/BX 4BX/CA)

## (undated) DEVICE — CLOSURE SKIN STRIP 1/2 X 4 IN - (STERI STRIP) (50/BX 4BX/CA)

## (undated) DEVICE — SPONGE GAUZESTER 4 X 4 4PLY - (128PK/CA)

## (undated) DEVICE — STERI STRIP COMPOUND BENZOIN - TINCTURE 0.6ML WITH APPLICATOR (40EA/BX)

## (undated) DEVICE — CANISTER SUCTION RIGID RED 1500CC (40EA/CA)

## (undated) DEVICE — KIT  I.V. START (100EA/CA)

## (undated) DEVICE — MANIFOLD NEPTUNE 1 PORT (20/PK)

## (undated) DEVICE — SET LEADWIRE 5 LEAD BEDSIDE DISPOSABLE ECG (1SET OF 5/EA)

## (undated) DEVICE — GLOVE BIOGEL SZ 7 SURGICAL PF LTX - (50PR/BX 4BX/CA)

## (undated) DEVICE — PACK MINOR BASIN - (2EA/CA)

## (undated) DEVICE — KIT ANESTHESIA W/CIRCUIT & 3/LT BAG W/FILTER (20EA/CA)

## (undated) DEVICE — GOWN WARMING STANDARD FLEX - (30/CA)

## (undated) DEVICE — TUBING CLEARLINK DUO-VENT - C-FLO (48EA/CA)

## (undated) DEVICE — GLOVE BIOGEL INDICATOR SZ 8 SURGICAL PF LTX - (50/BX 4BX/CA)

## (undated) DEVICE — GLOVE BIOGEL PI INDICATOR SZ 7.5 SURGICAL PF LF -(50/BX 4BX/CA)

## (undated) DEVICE — PROTECTOR ULNA NERVE - (36PR/CA)

## (undated) DEVICE — GOWN SURGEONS LARGE - (32/CA)

## (undated) DEVICE — GLOVE BIOGEL SZ 8 SURGICAL PF LTX - (50PR/BX 4BX/CA)

## (undated) DEVICE — SENSOR SPO2 NEO LNCS ADHESIVE (20/BX) SEE USER NOTES

## (undated) DEVICE — SUTURE GENERAL